# Patient Record
Sex: FEMALE
[De-identification: names, ages, dates, MRNs, and addresses within clinical notes are randomized per-mention and may not be internally consistent; named-entity substitution may affect disease eponyms.]

---

## 2024-09-19 ENCOUNTER — RX ONLY (OUTPATIENT)
Age: 79
Setting detail: RX ONLY
End: 2024-09-19

## 2024-09-19 RX ORDER — FLUOCINONIDE 0.5 MG/G
CREAM TOPICAL
Qty: 60 | Refills: 0 | Status: ERX | COMMUNITY
Start: 2024-09-19

## 2024-09-23 ENCOUNTER — RX ONLY (OUTPATIENT)
Age: 79
Setting detail: RX ONLY
End: 2024-09-23

## 2024-09-23 RX ORDER — FLUOCINONIDE 0.5 MG/G
CREAM TOPICAL
Qty: 60 | Refills: 1 | Status: ERX

## 2024-10-28 ENCOUNTER — APPOINTMENT (RX ONLY)
Dept: URBAN - METROPOLITAN AREA CLINIC 61 | Facility: CLINIC | Age: 79
Setting detail: DERMATOLOGY
End: 2024-10-28

## 2024-10-28 DIAGNOSIS — R21 RASH AND OTHER NONSPECIFIC SKIN ERUPTION: ICD-10-CM

## 2024-10-28 DIAGNOSIS — L71.8 OTHER ROSACEA: ICD-10-CM

## 2024-10-28 PROCEDURE — 99213 OFFICE O/P EST LOW 20 MIN: CPT

## 2024-10-28 PROCEDURE — ? PRESCRIPTION

## 2024-10-28 PROCEDURE — ? PATIENT SPECIFIC COUNSELING

## 2024-10-28 RX ORDER — CLOBETASOL PROPIONATE 0.5 MG/G
OINTMENT TOPICAL
Qty: 15 | Refills: 0 | Status: ERX | COMMUNITY
Start: 2024-10-28

## 2024-10-28 RX ORDER — METRONIDAZOLE 7.5 MG/G
GEL TOPICAL
Qty: 45 | Refills: 0 | Status: ERX | COMMUNITY
Start: 2024-10-28

## 2024-10-28 RX ADMIN — METRONIDAZOLE: 7.5 GEL TOPICAL at 00:00

## 2024-10-28 RX ADMIN — CLOBETASOL PROPIONATE: 0.5 OINTMENT TOPICAL at 00:00

## 2024-10-28 NOTE — HPI: SKIN LESION
How Severe Is Your Skin Lesion?: mild
Has Your Skin Lesion Been Treated?: not been treated
Is This A New Presentation, Or A Follow-Up?: Skin Lesions
Which Family Member (Optional)?: Sister
Additional History: Patient here today for a red spot on nose and also two red spots on right foot.

## 2024-10-28 NOTE — PROCEDURE: PATIENT SPECIFIC COUNSELING
Patient reports she developed to very itchy red spots on her right dorsal foot about 3 weeks ago.  She denies ever having any pain at this site.  On exam today there are 2 sites with central erosion or ulceration.  We discussed that this could be consistent with a herpetic diagnosis versus excoriated eczematous changes.  As they appear very inflamed, are very itchy, and it would be too late to initiate antiviral therapy, we will treat with topical steroids to see if that leads to resolution.  If topical steroid treatment does not lead to resolution, patient was instructed to return to clinic.  At that point we may consider biopsy to confirm the diagnosis.  Patient will start clobetasol ointment twice daily for 2 weeks to affected area.
Detail Level: Zone
Patient reports she developed a red area on the left nasal supratip.  She treated this site with tea tree oil and it improved.  She reports that she has had 3 separate flares of redness on her nose, each which resolved with topical tea tree oil. On exam today patient has a pink macule which completely blanches with diascopy.  Patient overall has changes that could be consistent with rosacea.  Advised that the recurrent flaring of redness is likely flaring of rosacea on the nasal tip.  We will treat with MetroGel to see if that is effective to keep these flares under control.  Patient was advised that if this site bumps up, become painful, or starts bleeding, she should return to clinic for evaluation.  There were no cutaneous signs of basal cell carcinoma on exam today, but patient has had a prior history of that diagnosis.

## 2025-04-16 ENCOUNTER — APPOINTMENT (OUTPATIENT)
Dept: URBAN - METROPOLITAN AREA CLINIC 61 | Facility: CLINIC | Age: 80
Setting detail: DERMATOLOGY
End: 2025-04-16

## 2025-04-16 DIAGNOSIS — L73.9 FOLLICULAR DISORDER, UNSPECIFIED: ICD-10-CM

## 2025-04-16 DIAGNOSIS — L20.89 OTHER ATOPIC DERMATITIS: ICD-10-CM

## 2025-04-16 DIAGNOSIS — L82.1 OTHER SEBORRHEIC KERATOSIS: ICD-10-CM

## 2025-04-16 DIAGNOSIS — L81.4 OTHER MELANIN HYPERPIGMENTATION: ICD-10-CM

## 2025-04-16 DIAGNOSIS — D18.0 HEMANGIOMA: ICD-10-CM

## 2025-04-16 PROBLEM — D18.01 HEMANGIOMA OF SKIN AND SUBCUTANEOUS TISSUE: Status: ACTIVE | Noted: 2025-04-16

## 2025-04-16 PROCEDURE — 99213 OFFICE O/P EST LOW 20 MIN: CPT

## 2025-04-16 PROCEDURE — ? COUNSELING

## 2025-04-16 ASSESSMENT — LOCATION DETAILED DESCRIPTION DERM
LOCATION DETAILED: LEFT MEDIAL FRONTAL SCALP
LOCATION DETAILED: LEFT INFERIOR POSTAURICULAR SKIN
LOCATION DETAILED: RIGHT DORSAL FOOT
LOCATION DETAILED: RIGHT INFERIOR FOREHEAD
LOCATION DETAILED: RIGHT RIB CAGE

## 2025-04-16 ASSESSMENT — LOCATION SIMPLE DESCRIPTION DERM
LOCATION SIMPLE: LEFT SCALP
LOCATION SIMPLE: RIGHT FOOT
LOCATION SIMPLE: RIGHT FOREHEAD
LOCATION SIMPLE: SCALP
LOCATION SIMPLE: ABDOMEN

## 2025-04-16 ASSESSMENT — LOCATION ZONE DERM
LOCATION ZONE: TRUNK
LOCATION ZONE: FEET
LOCATION ZONE: SCALP
LOCATION ZONE: FACE

## 2025-04-16 NOTE — PROCEDURE: COUNSELING
Patient Specific Counseling (Will Not Stick From Patient To Patient): Patient comes in today with recurrent itching on the dorsal foot. Last appointment she had a few scaly, excoriated plaques at this site that have completely resolved. At that time, it red and super itchy, so we prescribed topical clobetasol. She states that it was too expensive, so she didn't pick it up. Those red areas resolved, but she is getting recurrent itching at this site. There is no rash present today, but patient states that if she touches it or puts anything on it, it gets intensely itchy. We are going to give her two samples of Opzelura cream to apply when the itching flares. I advised patient to avoid soap to this area and to only use a moisturizer to this site as any scented or harsh products may cause it to irritate and itch. She was advised to also get some Sarna anti-itch lotion to apply to site when the itching flares. We discussed that eczema is \"the itch that rashes\", so patient had some eczematous appearing lesions at the last visit, but trying to avoid scratching will help prevent developing into an eczematous eruption. She will try the Sarna and Opzelura and see if that keeps it controlled.
Detail Level: Detailed
Patient Specific Counseling (Will Not Stick From Patient To Patient): Patient states that she has had scabs that she picked off of her scalp that bled in the last two months. She was concerned about this spot due to her having a basal cell in this area previously on the scalp. Upon evaluation it appears to be most consistent with Scalp Folliculitis. I recommended that patient try T-Sal shampoo from [employer]. She will apply this to scalp and wash out. This will be our first step to treat. If it does not resolve with this we will move forward with other treatment regimens if needed.
Patient Specific Counseling (Will Not Stick From Patient To Patient): Patient may want cosmetic removal in the future.